# Patient Record
Sex: MALE | Race: WHITE
[De-identification: names, ages, dates, MRNs, and addresses within clinical notes are randomized per-mention and may not be internally consistent; named-entity substitution may affect disease eponyms.]

---

## 2018-03-08 NOTE — RAD
LEFT SHOULDER 3 VIEWS:

 

Date:  03/08/18

 

HISTORY:  

Left shoulder pain. Left arm numbness and tingling. Left arm injury while lifting. 

 

FINDINGS:

Humeral head is normally positioned. AC joint is normally aligned. No fracture, dislocation, or other
 abnormality identified. 

 

IMPRESSION: 

Unremarkable left shoulder. 

 

 

POS: [facility]

## 2018-03-08 NOTE — CT
CT CERVICAL SPINE NONCONTRAST:

 

Date:  03/08/18 

 

HISTORY:  

Neck injury. Left arm radiculopathy. 

 

FINDINGS:

Vertebral body heights and alignment are maintained. Cervicothoracic junction is intact. No acute fra
cture or dislocation. 

 

IMPRESSION: 

No acute osseous abnormalities of the cervical spine are demonstrated. 

 

 

POS: [facility]

## 2018-04-21 NOTE — RAD
LEFT HAND 3 VIEWS:

 

Date:  04/21/18 

 

HISTORY:  

Swelling. 

 

COMPARISON:  

None. 

 

FINDINGS:

There is a linear radiopaque foreign object measuring approximately 3.0 x 1.0 mm in the lateral soft 
tissues of the index finger at the level of the distal interphalangeal joint. No acute fracture. Ther
e is an ossicle of the distal ulnar styloid, likely from prior fracture. 

 

IMPRESSION: 

Linear small radiopaque foreign object as described. No acute fracture or malalignment. 

 

 

POS: [facility]

## 2019-01-21 ENCOUNTER — HOSPITAL ENCOUNTER (OUTPATIENT)
Dept: HOSPITAL 92 - SCSULT | Age: 39
Discharge: HOME | End: 2019-01-21
Attending: OBSTETRICS & GYNECOLOGY
Payer: MEDICARE

## 2019-01-21 DIAGNOSIS — R10.11: Primary | ICD-10-CM

## 2019-01-21 PROCEDURE — 76705 ECHO EXAM OF ABDOMEN: CPT

## 2019-01-21 NOTE — ULT
GALLBLADDER ULTRASOUND:

 

HISTORY:

Right upper quadrant pain.

 

COMPARISON:

None.

 

TECHNIQUE:

Utilizing a Multi-Hertz transducer, sonographic imaging of the right upper quadrant was performed in 
the longitudinal and transverse plane.

 

FINDINGS:

The head of the pancreas has a normal echotexture.  The remainder of the pancreas is obscured by fermin
l gas.

 

The hepatic parenchyma has a normal echotexture.  No hepatic masses or intrahepatic biliary dilatatio
n.  The contour of the hepatic margin is maintained.  The right hepatic lobe measures 15.3 cm.

 

The main portal vein is patent.  Appropriate directional flow.

 

Right renal cortical thinning is noted.  No hydronephrosis.  The right kidney measures 5.4 x 10.7 x 4
.7 cm.

 

Common bile duct diameter is 0.3 cm.

 

No sonographic evidence of cholelithiasis, gallbladder wall thickening, or pericholecystic fluid.  Ne
gative George sign.  

 

IMPRESSION:

No sonographic evidence of cholelithiasis or cholecystitis.

 

POS: [facility]

## 2019-02-08 ENCOUNTER — HOSPITAL ENCOUNTER (EMERGENCY)
Dept: HOSPITAL 9 - MADERS | Age: 39
Discharge: HOME | End: 2019-02-08
Payer: MEDICARE

## 2019-02-08 DIAGNOSIS — T78.40XA: Primary | ICD-10-CM

## 2019-02-08 DIAGNOSIS — Z79.899: ICD-10-CM

## 2019-02-08 DIAGNOSIS — I10: ICD-10-CM

## 2019-02-08 PROCEDURE — 99283 EMERGENCY DEPT VISIT LOW MDM: CPT
